# Patient Record
Sex: FEMALE | Race: BLACK OR AFRICAN AMERICAN | URBAN - METROPOLITAN AREA
[De-identification: names, ages, dates, MRNs, and addresses within clinical notes are randomized per-mention and may not be internally consistent; named-entity substitution may affect disease eponyms.]

---

## 2019-09-24 ENCOUNTER — HISTORICAL (OUTPATIENT)
Dept: ADMINISTRATIVE | Facility: HOSPITAL | Age: 24
End: 2019-09-24

## 2019-09-26 LAB
FINAL CULTURE: NORMAL
FINAL CULTURE: NORMAL

## 2020-08-23 ENCOUNTER — HISTORICAL (OUTPATIENT)
Dept: ADMINISTRATIVE | Facility: HOSPITAL | Age: 25
End: 2020-08-23

## 2020-08-23 LAB
APPEARANCE, UA: CLEAR
B-HCG SERPL QL: NEGATIVE
BACTERIA SPEC CULT: ABNORMAL /HPF
BILIRUB UR QL STRIP: NEGATIVE
COLOR UR: ABNORMAL
GLUCOSE (UA): NEGATIVE
HGB UR QL STRIP: NEGATIVE
KETONES UR QL STRIP: NEGATIVE
LEUKOCYTE ESTERASE UR QL STRIP: ABNORMAL
MUCOUS THREADS URNS QL MICRO: ABNORMAL
NITRITE UR QL STRIP: NEGATIVE
PH UR STRIP: 5.5 [PH] (ref 5–7)
PROT UR QL STRIP: NEGATIVE
RBC #/AREA URNS HPF: ABNORMAL /HPF
SP GR UR STRIP: 1.02 (ref 1–1.03)
SQUAMOUS EPITHELIAL, UA: ABNORMAL /LPF
UROBILINOGEN UR STRIP-ACNC: NEGATIVE
WBC #/AREA URNS HPF: ABNORMAL /HPF

## 2020-08-25 LAB — FINAL CULTURE: NORMAL

## 2022-04-30 NOTE — ED PROVIDER NOTES
"   Patient:   Gerson Johnson Carlos            MRN: [id number]            FIN: [id number]               Age:   25 years     Sex:  Female     :  1995   Associated Diagnoses:   Low back pain syndrome; Urinary tract infection   Author:   Travis Parish DO      Basic Information   Additional information: Chief Complaint from Nursing Triage Note : Chief Complaint   2020 15:04 CDT      Chief Complaint           c/o generalized body aches for past few days. denies any recent trauma/illness. afebrile, no cough. pain worst with movement  .      History of Present Illness   The patient presents with body aches and multiple musculoskeletal pains.  The onset was 1  weeks ago.  The course/duration of symptoms is fluctuating in intensity.  Location: Lower back. The character of symptoms is "pain" and achy.  The degree of pain is moderate.  The exacerbating factor is movement.  The relieving factor is none.  Risk factors consist of none.  Prior episodes: rare.  Therapy today: none.  Associated symptoms: chills, denies chest pain, denies abdominal pain, denies nausea, denies vomiting and denies fever.        Review of Systems   Constitutional symptoms:  Negative except as documented in HPI.   Skin symptoms:  Negative except as documented in HPI.   Eye symptoms:  Negative except as documented in HPI.   ENMT symptoms:  Negative except as documented in HPI.   Respiratory symptoms:  Negative except as documented in HPI.   Cardiovascular symptoms:  Negative except as documented in HPI.   Gastrointestinal symptoms:  Negative except as documented in HPI.   Genitourinary symptoms:  Negative except as documented in HPI.   Musculoskeletal symptoms:  Negative except as documented in HPI.   Neurologic symptoms:  Negative except as documented in HPI.   Psychiatric symptoms:  Negative except as documented in HPI.   Endocrine symptoms:  Negative except as documented in HPI.   Hematologic/Lymphatic symptoms:  " Negative except as documented in HPI.   Allergy/immunologic symptoms:  Negative except as documented in HPI.             Additional review of systems information: All other systems reviewed and otherwise negative.      Health Status   Allergies:    Allergies (1) Active Reaction  Dilaudid sob  .   Medications:  (Selected)   Documented Medications  Documented  Prenatal Multivitamins with Folic Acid 1 mg oral tablet: 1 tab(s), Oral, Daily  medroxyPROGESTERone 150 mg/mL intramuscular suspension: 150 mg, IM, As Directed  norethindrone 0.35 mg oral tablet: 0.35 mg = 1 tab(s), Oral, Daily.      Past Medical/ Family/ Social History   Medical history:    Resolved  Pregnant ([id number]): Onset on 2019 at 23 years.  Resolved on 10/17/2019 at 24 years..   Surgical history:     delivery only; (73104) on 10/17/2019 at 24 Years..   Family history:    .   Social history:    Social & Psychosocial Habits    Alcohol  10/16/2019  Use: Never    Tobacco  2018  Use: Current every day smoker    Patient Wants Consult For Cessation Counseling Yes    2019  Use: 10 or more cigarettes (1/    Patient Wants Consult For Cessation Counseling No    10/16/2019  Use: 10 or more cigarettes (1/    Patient Wants Consult For Cessation Counseling No    2020  Use: 10 or more cigarettes (1/    Type: Cigarettes    Patient Wants Consult For Cessation Counseling Yes    Abuse/Neglect  10/16/2019  [facility] Any signs of abuse or neglect No    Feels unsafe at home: Yes    Safe place to go: Yes    2020  [facility] Any signs of abuse or neglect No    2020  [facility] Any signs of abuse or neglect No  .   Problem list:    Active Problems (1)  Tobacco user   .      Physical Examination               Vital Signs      Vital Signs (last 24 hrs)_____  Last Charted___________  Temp Oral     36.9 DegC  (AUG 23 15:04)  Heart Rate Peripheral   66 bpm  (AUG 23 15:04)  Resp Rate         16 br/min  (AUG 23 15:04)  SBP      124 mmHg  (AUG 23  15:04)  DBP      76 mmHg  (AUG 23 15:04)  SpO2      99 %  (AUG 23 15:04)  .               Per nurse's notes.   Measurements   8/23/2020 15:04 CDT      Weight Dosing             62 kg                             Weight Measured and Calculated in Lbs     136.69 lb                             Weight Estimated          62 kg                             Height/Length Dosing      157 cm                             Height/Length Estimated   157 cm                             Body Mass Index Estimated 25.15 kg/m2  .   Basic Oxygen Information   8/23/2020 15:04 CDT      SpO2                      99 %                             Oxygen Therapy            Room air  .   General:  Alert, no acute distress.    Skin:  Warm, Not cyanotic,    Head:  Normocephalic, atraumatic.    Neck:  Supple, trachea midline, no JVD.    Eye:  Vision unchanged.   Ears, nose, mouth and throat:  Oral mucosa moist.   Cardiovascular:  Regular rate and rhythm, No murmur, Normal peripheral perfusion.    Respiratory:  Lungs are clear to auscultation, respirations are non-labored.    Chest wall:  No tenderness.   Back:  Nontender, no step-offs.    Musculoskeletal:  Normal ROM, normal strength, no deformity.    Gastrointestinal:  Soft, Nontender, Non distended, Normal bowel sounds, No organomegaly.    Genitourinary:  no cva tenderness.   Neurological:  No focal neurological deficit observed, normal speech observed.    Lymphatics:  No lymphadenopathy.   Psychiatric:  Cooperative, appropriate mood & affect.       Medical Decision Making   Differential Diagnosis:  Arthritis, viral syndrome, back pain.    Documents reviewed:  Emergency department nurses' notes, emergency department records, prior records.    Orders  Launch Order Profile (Selected)   Inpatient Orders  Ordered (In Transit)  Urine Culture [id number]: Stat collect, 08/23/20 15:13:00 CDT, Urine, Collected, Nurse collect, [id number], Stop date 08/23/20 15:13:00 CDT  Completed  Pregnancy Test Urine:  Stat collect, Urine, 08/23/20 15:10:00 CDT, Stop date 08/23/20 15:10:00 CDT, Nurse collect, Print Label By Order Location, 08/23/20 15:10:00 CDT  Toradol: 60 mg, form: Injection, IM, Once, first dose 08/23/20 15:43:00 CDT, stop date 08/23/20 15:43:00 CDT, STAT  Urinalysis Only Microscopic: Stat collect, Urine, Collected, 08/23/20 15:13:00 CDT, Stop date 08/23/20 15:13:00 CDT, Nurse collect  Urinalysis with Microscopic if Indicated: Stat collect, Urine, 08/23/20 15:10:00 CDT, Stop date 08/23/20 15:10:00 CDT, Nurse collect  dexamethasone (for Inj.): 6 mg, form: Injection, IM, Once, first dose 08/23/20 15:43:00 CDT, stop date 08/23/20 15:43:00 CDT, STAT  Prescriptions  Prescribed  Macrobid 100 mg oral capsule: 100 mg = 1 cap(s), Oral, BID, X 5 day(s), # 10 cap(s), 0 Refill(s), Pharmacy: [facility] #[id number], 157, cm, Height/Length Dosing, 08/23/20 15:04:00 CDT, 62, kg, Weight Dosing, 08/23/20 15:04:00 CDT  Toradol 10 mg oral tablet: 10 mg = 1 tab(s), Oral, q4hr, PRN PRN for pain, # 15 tab(s), 0 Refill(s), Pharmacy: [facility] #[id number], 157, cm, Height/Length Dosing, 08/23/20 15:04:00 CDT, 62, kg, Weight Dosing, 08/23/20 15:04:00 CDT.   Results review:  Lab results : Lab View   8/23/2020 15:13 CDT      U Beta hCG Ql             Negative                             UA Appear                 CLEAR                             UA Color                  STRAW                             UA Spec Grav              1.025                             UA Bili                   Negative                             UA pH                     5.5                             UA Urobilinogen           Negative                             UA Blood                  Negative                             UA Glucose                Negative                             UA Ketones                Negative                             UA Protein                Negative                             UA Nitrite                Negative                              UA Leuk Est               Trace                             UA WBC                    2-5 /HPF                             UA RBC                    2-5 /HPF                             UA Mucous                 None Seen                             UA Bacteria               Rare /HPF                             UA Squam Epithelial       Rare /LPF  .      Impression and Plan   Diagnosis   Low back pain syndrome (UAQ66-OT M54.5)   Urinary tract infection ([id number] N39.0)   Plan   Condition: Improved.    Disposition: Medically cleared, Discharged: Time  8/23/2020 15:44:00, to home.    Prescriptions: Launch Meds List (Selected)   Inpatient Medications  Ordered  Toradol: 60 mg, form: Injection, IM, Once, first dose 08/23/20 15:43:00 CDT, stop date 08/23/20 15:43:00 CDT, STAT  dexamethasone (for Inj.): 6 mg, form: Injection, IM, Once, first dose 08/23/20 15:43:00 CDT, stop date 08/23/20 15:43:00 CDT, STAT  Prescriptions  Prescribed  Macrobid 100 mg oral capsule: 100 mg = 1 cap(s), Oral, BID, X 5 day(s), # 10 cap(s), 0 Refill(s), Pharmacy: [facility] #[id number], 157, cm, Height/Length Dosing, 08/23/20 15:04:00 CDT, 62, kg, Weight Dosing, 08/23/20 15:04:00 CDT  Toradol 10 mg oral tablet: 10 mg = 1 tab(s), Oral, q4hr, PRN PRN for pain, # 15 tab(s), 0 Refill(s), Pharmacy: [facility] #[id number], 157, cm, Height/Length Dosing, 08/23/20 15:04:00 CDT, 62, kg, Weight Dosing, 08/23/20 15:04:00 CDT.    Patient was given the following educational materials: Back Exercises, Easy-to-Read, Urinary Tract Infection, Adult, Easy-to-Read, Urinary Tract Infection, Adult, Easy-to-Read, Back Exercises, Easy-to-Read.    Limitations: Limited activity.    Follow up with: Report to Emergency Department if symptoms return or worsen; Follow-up with PCP in 3 to 5 days.    Counseled: Patient, Regarding diagnosis, Regarding diagnostic results, Regarding treatment plan, Regarding prescription, Patient indicated  understanding of instructions.    Orders: Launch Orders   Admit/Transfer/Discharge:  Discharge (Order): 8/23/2020 15:46 CDT, Home.